# Patient Record
Sex: FEMALE | Race: WHITE
[De-identification: names, ages, dates, MRNs, and addresses within clinical notes are randomized per-mention and may not be internally consistent; named-entity substitution may affect disease eponyms.]

---

## 2017-12-26 ENCOUNTER — HOSPITAL ENCOUNTER (EMERGENCY)
Dept: HOSPITAL 61 - ER | Age: 22
Discharge: HOME | End: 2017-12-26
Payer: COMMERCIAL

## 2017-12-26 DIAGNOSIS — N39.0: ICD-10-CM

## 2017-12-26 DIAGNOSIS — N12: Primary | ICD-10-CM

## 2017-12-26 LAB
ADD MAN DIFF?: YES
ALBUMIN SERPL-MCNC: 3.9 G/DL (ref 3.4–5)
ALBUMIN/GLOB SERPL: 1 {RATIO} (ref 1–1.7)
ALP SERPL-CCNC: 104 U/L (ref 46–116)
ALT (SGPT): 27 U/L (ref 14–59)
ANION GAP SERPL CALC-SCNC: 13 MMOL/L (ref 6–14)
ANISOCYTOSIS: SLIGHT
AST SERPL-CCNC: 17 U/L (ref 15–37)
BACTERIA,URINE: (no result) /HPF
BASO #: 0 X10^3/UL (ref 0–0.2)
BASO %: 0 % (ref 0–3)
BILIRUBIN,URINE: NEGATIVE
BLOOD UREA NITROGEN: 8 MG/DL (ref 7–20)
BUN/CREAT SERPL: 8 (ref 6–20)
CALCIUM: 8.7 MG/DL (ref 8.5–10.1)
CHLORIDE: 100 MMOL/L (ref 98–107)
CO2 SERPL-SCNC: 25 MMOL/L (ref 21–32)
CREAT SERPL-MCNC: 1 MG/DL (ref 0.6–1)
EOS %: 0 % (ref 0–3)
GFR SERPLBLD BASED ON 1.73 SQ M-ARVRAT: 70 ML/MIN
GLOBULIN SER-MCNC: 3.9 G/DL (ref 2.2–3.8)
GLUCOSE SERPL-MCNC: 151 MG/DL (ref 70–99)
GLUCOSE,URINE: NEGATIVE MG/DL
HCG SERPL-ACNC: 15 X10^3/UL (ref 4–11)
HEMATOCRIT: 36.6 % (ref 36–47)
HEMOGLOBIN: 11.8 G/DL (ref 12–15.5)
LYMPH #: 0.5 X10^3/UL (ref 1–4.8)
LYMPH %: 3 % (ref 24–48)
MEAN CORPUSCULAR HEMOGLOBIN: 25 PG (ref 25–35)
MEAN CORPUSCULAR HGB CONC: 32 G/DL (ref 31–37)
MEAN CORPUSCULAR VOLUME: 77 FL (ref 79–100)
MICROCYTOSIS: SLIGHT
MONO %: 6 % (ref 0–9)
NEUT %: 91 % (ref 31–73)
NITRITE,URINE: POSITIVE
OVALOCYTES: (no result)
PH,URINE: 6
PLATELET COUNT: 246 X10^3/UL (ref 140–400)
PLT ESTIMATE: ADEQUATE
POTASSIUM SERPL-SCNC: 3.8 MMOL/L (ref 3.5–5.1)
PROTEIN,URINE: 100 MG/DL
RBC,URINE: (no result) /HPF (ref 0–2)
RED BLOOD COUNT: 4.74 X10^6/UL (ref 3.5–5.4)
RED CELL DISTRIBUTION WIDTH: 18.4 % (ref 11.5–14.5)
SODIUM: 138 MMOL/L (ref 136–145)
SPECIFIC GRAVITY,URINE: 1.02
SQUAMOUS EPITHELIAL CELL,UR: (no result) /LPF
TOTAL BILIRUBIN: 0.8 MG/DL (ref 0.2–1)
TOTAL PROTEIN: 7.8 G/DL (ref 6.4–8.2)
URINE HCG POC: (no result)
UROBILINOGEN,URINE: 0.2 MG/DL
WBC,URINE: (no result) /HPF (ref 0–4)

## 2017-12-26 PROCEDURE — 87186 SC STD MICRODIL/AGAR DIL: CPT

## 2017-12-26 PROCEDURE — 87086 URINE CULTURE/COLONY COUNT: CPT

## 2017-12-26 PROCEDURE — 99284 EMERGENCY DEPT VISIT MOD MDM: CPT

## 2017-12-26 PROCEDURE — 81025 URINE PREGNANCY TEST: CPT

## 2017-12-26 PROCEDURE — 85025 COMPLETE CBC W/AUTO DIFF WBC: CPT

## 2017-12-26 PROCEDURE — 85007 BL SMEAR W/DIFF WBC COUNT: CPT

## 2017-12-26 PROCEDURE — 36415 COLL VENOUS BLD VENIPUNCTURE: CPT

## 2017-12-26 PROCEDURE — 96365 THER/PROPH/DIAG IV INF INIT: CPT

## 2017-12-26 PROCEDURE — 96361 HYDRATE IV INFUSION ADD-ON: CPT

## 2017-12-26 PROCEDURE — 81001 URINALYSIS AUTO W/SCOPE: CPT

## 2017-12-26 PROCEDURE — 96375 TX/PRO/DX INJ NEW DRUG ADDON: CPT

## 2017-12-26 PROCEDURE — 80053 COMPREHEN METABOLIC PANEL: CPT

## 2017-12-26 RX ADMIN — BACITRACIN 1 MLS/HR: 5000 INJECTION, POWDER, FOR SOLUTION INTRAMUSCULAR at 14:20

## 2017-12-26 RX ADMIN — ACETAMINOPHEN 1 MG: 500 TABLET ORAL at 14:25

## 2017-12-26 RX ADMIN — BACITRACIN 1 MLS/HR: 5000 INJECTION, POWDER, FOR SOLUTION INTRAMUSCULAR at 13:00

## 2017-12-26 RX ADMIN — ONDANSETRON 1 MG: 2 INJECTION INTRAMUSCULAR; INTRAVENOUS at 13:00

## 2018-09-16 ENCOUNTER — HOSPITAL ENCOUNTER (EMERGENCY)
Dept: HOSPITAL 61 - ER | Age: 23
Discharge: HOME | End: 2018-09-16
Payer: SELF-PAY

## 2018-09-16 VITALS — BODY MASS INDEX: 16.07 KG/M2 | WEIGHT: 100 LBS | HEIGHT: 66 IN

## 2018-09-16 VITALS — DIASTOLIC BLOOD PRESSURE: 57 MMHG | SYSTOLIC BLOOD PRESSURE: 104 MMHG

## 2018-09-16 DIAGNOSIS — H92.02: ICD-10-CM

## 2018-09-16 DIAGNOSIS — R10.9: ICD-10-CM

## 2018-09-16 DIAGNOSIS — J18.9: Primary | ICD-10-CM

## 2018-09-16 LAB
ALBUMIN SERPL-MCNC: 3.5 G/DL (ref 3.4–5)
ALBUMIN/GLOB SERPL: 0.8 {RATIO} (ref 1–1.7)
ALP SERPL-CCNC: 73 U/L (ref 46–116)
ALT SERPL-CCNC: 17 U/L (ref 14–59)
ANION GAP SERPL CALC-SCNC: 11 MMOL/L (ref 6–14)
APTT PPP: YELLOW S
AST SERPL-CCNC: 18 U/L (ref 15–37)
BACTERIA #/AREA URNS HPF: (no result) /HPF
BASOPHILS # BLD AUTO: 0 X10^3/UL (ref 0–0.2)
BASOPHILS NFR BLD: 0 % (ref 0–3)
BILIRUB SERPL-MCNC: 1 MG/DL (ref 0.2–1)
BILIRUB UR QL STRIP: NEGATIVE
BUN SERPL-MCNC: 6 MG/DL (ref 7–20)
BUN/CREAT SERPL: 8 (ref 6–20)
CALCIUM SERPL-MCNC: 9.3 MG/DL (ref 8.5–10.1)
CHLORIDE SERPL-SCNC: 97 MMOL/L (ref 98–107)
CO2 SERPL-SCNC: 27 MMOL/L (ref 21–32)
CREAT SERPL-MCNC: 0.8 MG/DL (ref 0.6–1)
EOSINOPHIL NFR BLD: 0 % (ref 0–3)
EOSINOPHIL NFR BLD: 0 X10^3/UL (ref 0–0.7)
ERYTHROCYTE [DISTWIDTH] IN BLOOD BY AUTOMATED COUNT: 12.7 % (ref 11.5–14.5)
FIBRINOGEN PPP-MCNC: CLEAR MG/DL
GFR SERPLBLD BASED ON 1.73 SQ M-ARVRAT: 89.7 ML/MIN
GLOBULIN SER-MCNC: 4.2 G/DL (ref 2.2–3.8)
GLUCOSE SERPL-MCNC: 101 MG/DL (ref 70–99)
HCT VFR BLD CALC: 39.9 % (ref 36–47)
HGB BLD-MCNC: 14 G/DL (ref 12–15.5)
LYMPHOCYTES # BLD: 0.8 X10^3/UL (ref 1–4.8)
LYMPHOCYTES NFR BLD AUTO: 8 % (ref 24–48)
MCH RBC QN AUTO: 31 PG (ref 25–35)
MCHC RBC AUTO-ENTMCNC: 35 G/DL (ref 31–37)
MCV RBC AUTO: 89 FL (ref 79–100)
MONO #: 0.8 X10^3/UL (ref 0–1.1)
MONOCYTES NFR BLD: 8 % (ref 0–9)
NEUT #: 9.5 X10^3UL (ref 1.8–7.7)
NEUTROPHILS NFR BLD AUTO: 85 % (ref 31–73)
NITRITE UR QL STRIP: POSITIVE
PH UR STRIP: 6.5 [PH]
PLATELET # BLD AUTO: 209 X10^3/UL (ref 140–400)
POTASSIUM SERPL-SCNC: 3.7 MMOL/L (ref 3.5–5.1)
PROT SERPL-MCNC: 7.7 G/DL (ref 6.4–8.2)
PROT UR STRIP-MCNC: 30 MG/DL
RBC # BLD AUTO: 4.5 X10^6/UL (ref 3.5–5.4)
RBC #/AREA URNS HPF: (no result) /HPF (ref 0–2)
SODIUM SERPL-SCNC: 135 MMOL/L (ref 136–145)
SQUAMOUS #/AREA URNS LPF: (no result) /LPF
UROBILINOGEN UR-MCNC: 1 MG/DL
WBC # BLD AUTO: 11.2 X10^3/UL (ref 4–11)
WBC #/AREA URNS HPF: >40 /HPF (ref 0–4)

## 2018-09-16 PROCEDURE — 85025 COMPLETE CBC W/AUTO DIFF WBC: CPT

## 2018-09-16 PROCEDURE — 99285 EMERGENCY DEPT VISIT HI MDM: CPT

## 2018-09-16 PROCEDURE — 74176 CT ABD & PELVIS W/O CONTRAST: CPT

## 2018-09-16 PROCEDURE — 96374 THER/PROPH/DIAG INJ IV PUSH: CPT

## 2018-09-16 PROCEDURE — 87086 URINE CULTURE/COLONY COUNT: CPT

## 2018-09-16 PROCEDURE — 81025 URINE PREGNANCY TEST: CPT

## 2018-09-16 PROCEDURE — 71046 X-RAY EXAM CHEST 2 VIEWS: CPT

## 2018-09-16 PROCEDURE — 81001 URINALYSIS AUTO W/SCOPE: CPT

## 2018-09-16 PROCEDURE — 80053 COMPREHEN METABOLIC PANEL: CPT

## 2018-09-16 PROCEDURE — 36415 COLL VENOUS BLD VENIPUNCTURE: CPT

## 2018-09-16 NOTE — RAD
CT abdomen and pelvis without contrast:

 

Reason for examination: Flank pain.

 

Helical images were obtained through the abdomen pelvis with no 

intravenous or oral contrast administered. Reconstruction was performed in

sagittal and coronal planes. The examination is compromised by streak 

artifact from right and screws in the thoracolumbar spine.

 

Exposure: One or more of the following individualized dose reduction 

techniques were utilized for this examination:  1. Automated exposure 

control  2. Adjustment of the mA and/or kV according to patient size  3. 

Use of iterative reconstruction technique.

 

The lung bases are clear. The heart size is normal with no pericardial 

effusion.

 

No abnormalities seen at the liver, spleen, adrenal glands or pancreas. 

The kidneys show no renal masses, renal calculi, hydronephrosis or 

evidence of obstructive uropathy. The intestinal tract does not appear to 

be abnormally distended and no abnormal wall thickening is seen. No 

diverticulosis or diverticulitis is evident.

 

The bladder is not distended. IUD is seen in the uterus. No adnexal masses

are seen. The limited amount of intra-abdominal fat the appendix is not 

identified. There is a thoracolumbar scoliosis with rods and screws 

present. No acute bony abnormalities are seen.

 

IMPRESSION:

 

Limited examination due to streak artifact from hardware in the 

thoracolumbar spine region. No renal calculi, hydronephrosis or evidence 

of obstructive uropathy. No other focal abnormality seen in the abdomen or

pelvis.

 

Electronically signed by: Guerda Escudero MD (9/16/2018 9:01 PM) [facility]

## 2018-09-16 NOTE — PHYS DOC
Past Medical History


Past Medical History:  Other


Additional Past Medical Histor:  scoliosis


Past Surgical History:  Other


Additional Past Surgical Histo:  SCOLIOSIS SX, hernia sx


Alcohol Use:  Occasionally


Drug Use:  Marijuana





Adult General


Chief Complaint


Chief Complaint:  Congestion





HPI


HPI





Patient is 23-year-old female that presents for evaluation of fever, cough, 

left ear pain, and bilateral flank pain. She reports symptoms started 

yesterday. She denies abdominal or chest pain.





Review of Systems


Review of Systems








Eyes: Denies change in visual acuity, redness, or eye pain []


HENT: Denies nasal congestion or sore throat []


Respiratory: Denies cough or shortness of breath []


Cardiovascular: No additional information not addressed in HPI []


GI: Denies abdominal pain, nausea, vomiting, bloody stools or diarrhea []


: Denies dysuria or hematuria []





Neurologic: Denies headache, focal weakness or sensory changes []


Endocrine: Denies polyuria or polydipsia []





All other systems were reviewed and found to be within normal limits, except as 

documented in this note.





Current Medications


Current Medications





Current Medications








 Medications


  (Trade)  Dose


 Ordered  Sig/Yee  Start Time


 Stop Time Status Last Admin


Dose Admin


 


 Acetaminophen


  (Tylenol)  1,000 mg  1X  ONCE  9/16/18 19:30


 9/16/18 19:35 DC 9/16/18 19:51


1,000 MG


 


 Ondansetron HCl


  (Zofran)  4 mg  1X  ONCE  9/16/18 19:00


 9/16/18 19:04 DC 9/16/18 19:51


4 MG


 


 Sodium Chloride  1,000 ml @ 


 1,000 mls/hr  1X  ONCE  9/16/18 19:00


 9/16/18 19:59 DC 9/16/18 19:50


1,000 MLS/HR











Allergies


Allergies





Allergies








Coded Allergies Type Severity Reaction Last Updated Verified


 


  No Known Drug Allergies    11/13/14 No











Physical Exam


Physical Exam





Constitutional: Well developed, well nourished, no acute distress, non-toxic 

appearance. []


HENT: Normocephalic, atraumatic, bilateral external ears normal, oropharynx 

moist, no oral exudates, nose normal. []


Eyes: PERRLA, EOMI, conjunctiva normal, no discharge. [] 


Neck: Normal range of motion, no tenderness, supple, no stridor. [] 


Cardiovascular:Heart rate regular rhythm, no murmur []


Lungs & Thorax:  Bilateral breath sounds clear to auscultation []


Abdomen: Bowel sounds normal, soft, no tenderness, no masses, no pulsatile 

masses. [] 


Skin: Warm, dry, no erythema, no rash. [] 


Back: CVA tenderness bilaterally[] 


Extremities: No tenderness, no cyanosis, no clubbing, ROM intact, no edema. [] 


Neurologic: Alert and oriented X 3, normal motor function, normal sensory 

function, no focal deficits noted. []


Psychologic: Affect normal, judgement normal, mood normal. []





Current Patient Data


Vital Signs





 Vital Signs








  Date Time  Temp Pulse Resp B/P (MAP) Pulse Ox O2 Delivery O2 Flow Rate FiO2


 


9/16/18 19:00 100.8 115 18 134/75 (94) 97 Room Air  





 100.8       








Lab Values





 Laboratory Tests








Test


 9/16/18


19:43 9/16/18


20:24 9/16/18


20:25


 


White Blood Count


 11.2 x10^3/uL


(4.0-11.0)  H 


 





 


Red Blood Count


 4.50 x10^6/uL


(3.50-5.40) 


 





 


Hemoglobin


 14.0 g/dL


(12.0-15.5) 


 





 


Hematocrit


 39.9 %


(36.0-47.0) 


 





 


Mean Corpuscular Volume


 89 fL ()


 


 





 


Mean Corpuscular Hemoglobin 31 pg (25-35)    


 


Mean Corpuscular Hemoglobin


Concent 35 g/dL


(31-37) 


 





 


Red Cell Distribution Width


 12.7 %


(11.5-14.5) 


 





 


Platelet Count


 209 x10^3/uL


(140-400) 


 





 


Neutrophils (%) (Auto) 85 % (31-73)  H  


 


Lymphocytes (%) (Auto) 8 % (24-48)  L  


 


Monocytes (%) (Auto) 8 % (0-9)    


 


Eosinophils (%) (Auto) 0 % (0-3)    


 


Basophils (%) (Auto) 0 % (0-3)    


 


Neutrophils # (Auto)


 9.5 x10^3uL


(1.8-7.7)  H 


 





 


Lymphocytes # (Auto)


 0.8 x10^3/uL


(1.0-4.8)  L 


 





 


Monocytes # (Auto)


 0.8 x10^3/uL


(0.0-1.1) 


 





 


Eosinophils # (Auto)


 0.0 x10^3/uL


(0.0-0.7) 


 





 


Basophils # (Auto)


 0.0 x10^3/uL


(0.0-0.2) 


 





 


Sodium Level


 135 mmol/L


(136-145)  L 


 





 


Potassium Level


 3.7 mmol/L


(3.5-5.1) 


 





 


Chloride Level


 97 mmol/L


()  L 


 





 


Carbon Dioxide Level


 27 mmol/L


(21-32) 


 





 


Anion Gap 11 (6-14)    


 


Blood Urea Nitrogen


 6 mg/dL (7-20)


L 


 





 


Creatinine


 0.8 mg/dL


(0.6-1.0) 


 





 


Estimated GFR


(Cockcroft-Gault) 89.7  


 


 





 


BUN/Creatinine Ratio 8 (6-20)    


 


Glucose Level


 101 mg/dL


(70-99)  H 


 





 


Calcium Level


 9.3 mg/dL


(8.5-10.1) 


 





 


Total Bilirubin


 1.0 mg/dL


(0.2-1.0) 


 





 


Aspartate Amino Transferase


(AST) 18 U/L (15-37)


 


 





 


Alanine Aminotransferase (ALT)


 17 U/L (14-59)


 


 





 


Alkaline Phosphatase


 73 U/L


() 


 





 


Total Protein


 7.7 g/dL


(6.4-8.2) 


 





 


Albumin


 3.5 g/dL


(3.4-5.0) 


 





 


Albumin/Globulin Ratio


 0.8 (1.0-1.7)


L 


 





 


POC Urine HCG, Qualitative


 


 Hcg negative


(Negative) 





 


Urine Collection Type   Unknown  


 


Urine Color   Yellow  


 


Urine Clarity   Clear  


 


Urine pH   6.5  


 


Urine Specific Gravity   1.010  


 


Urine Protein


 


 


 30 mg/dL


(NEG-TRACE)


 


Urine Glucose (UA)


 


 


 Negative mg/dL


(NEG)


 


Urine Ketones (Stick)


 


 


 >=80 mg/dL


(NEG)


 


Urine Blood   Trace (NEG)  


 


Urine Nitrite


 


 


 Positive (NEG)





 


Urine Bilirubin


 


 


 Negative (NEG)





 


Urine Urobilinogen Dipstick


 


 


 1.0 mg/dL (0.2


mg/dL)


 


Urine Leukocyte Esterase


 


 


 Moderate (NEG)





 


Urine RBC


 


 


 Occ /HPF (0-2)





 


Urine WBC


 


 


 >40 /HPF (0-4)





 


Urine Squamous Epithelial


Cells 


 


 Many /LPF  





 


Urine Transitional Epithelial


Cells 


 


 Occ /LPF  





 


Urine Renal Epithelial Cells   Occ /LPF  


 


Urine Bacteria


 


 


 Many /HPF


(0-FEW)


 


Urine Mucus   Mod /LPF  





 Laboratory Tests


9/16/18 19:43








 Laboratory Tests


9/16/18 19:43














EKG


EKG


[]





Radiology/Procedures


Radiology/Procedures


Wet read chest x-ray by myself and Dr. Graham, concern for possible developing 

infiltrates in the right lung.[]





PROCEDURE: CT ABDOMEN PELVIS WO CONTRAST





CT abdomen and pelvis without contrast:


 


Reason for examination: Flank pain.


 


Helical images were obtained through the abdomen pelvis with no 


intravenous or oral contrast administered. Reconstruction was performed in


sagittal and coronal planes. The examination is compromised by streak 


artifact from right and screws in the thoracolumbar spine.


 


Exposure: One or more of the following individualized dose reduction 


techniques were utilized for this examination:  1. Automated exposure 


control  2. Adjustment of the mA and/or kV according to patient size  3. 


Use of iterative reconstruction technique.


 


The lung bases are clear. The heart size is normal with no pericardial 


effusion.


 


No abnormalities seen at the liver, spleen, adrenal glands or pancreas. 


The kidneys show no renal masses, renal calculi, hydronephrosis or 


evidence of obstructive uropathy. The intestinal tract does not appear to 


be abnormally distended and no abnormal wall thickening is seen. No 


diverticulosis or diverticulitis is evident.


 


The bladder is not distended. IUD is seen in the uterus. No adnexal masses


are seen. The limited amount of intra-abdominal fat the appendix is not 


identified. There is a thoracolumbar scoliosis with rods and screws 


present. No acute bony abnormalities are seen.


 


IMPRESSION:


 


Limited examination due to streak artifact from hardware in the 


thoracolumbar spine region. No renal calculi, hydronephrosis or evidence 


of obstructive uropathy. No other focal abnormality seen in the abdomen or


pelvis.


 


Electronically signed by: Guerda Escudero MD (9/16/2018 9:01 PM) [facility]





Course & Med Decision Making


Course & Med Decision Making


Pertinent Labs and Imaging studies reviewed. (See chart for details)





[Concern for developing pneumonia in the right lung field, patient is placed on 

a Z-Evangelist, recommend close follow-up with primary care doctor in 2-3 days, return 

to ER for new or worsening symptoms.





Dragon Disclaimer


Dragon Disclaimer


This electronic medical record was generated, in whole or in part, using a 

voice recognition dictation system.





Departure


Departure


Impression:  


 Primary Impression:  


 Pneumonia


 Additional Impression:  


 Otalgia of left ear


Disposition:  01 HOME, SELF-CARE


Condition:  STABLE


Referrals:  


NO PCP (PCP)


Patient Instructions:  Otalgia, Pneumonia, Adult


Scripts


Azithromycin (AZITHROMYCIN PACKET) 1 Gm Packet


1 PACKET PO ONCE, #1 PACKET 0 Refills


   Prov: TEJAL CALLEJAS         9/16/18





Problem Qualifiers











TEJAL CALLEJAS Sep 16, 2018 21:19

## 2018-09-17 ENCOUNTER — HOSPITAL ENCOUNTER (EMERGENCY)
Dept: HOSPITAL 61 - ER | Age: 23
Discharge: HOME | End: 2018-09-17
Payer: SELF-PAY

## 2018-09-17 VITALS — SYSTOLIC BLOOD PRESSURE: 136 MMHG | DIASTOLIC BLOOD PRESSURE: 82 MMHG

## 2018-09-17 VITALS — WEIGHT: 100 LBS | BODY MASS INDEX: 16.07 KG/M2 | HEIGHT: 66 IN

## 2018-09-17 DIAGNOSIS — N12: Primary | ICD-10-CM

## 2018-09-17 DIAGNOSIS — F17.210: ICD-10-CM

## 2018-09-17 LAB
APTT PPP: YELLOW S
BACTERIA #/AREA URNS HPF: (no result) /HPF
BILIRUB UR QL STRIP: NEGATIVE
FIBRINOGEN PPP-MCNC: CLEAR MG/DL
NITRITE UR QL STRIP: NEGATIVE
PH UR STRIP: 6.5 [PH]
PROT UR STRIP-MCNC: NEGATIVE MG/DL
RBC #/AREA URNS HPF: (no result) /HPF (ref 0–2)
SQUAMOUS #/AREA URNS LPF: (no result) /LPF
UROBILINOGEN UR-MCNC: 1 MG/DL

## 2018-09-17 PROCEDURE — 87086 URINE CULTURE/COLONY COUNT: CPT

## 2018-09-17 PROCEDURE — 99284 EMERGENCY DEPT VISIT MOD MDM: CPT

## 2018-09-17 PROCEDURE — 81001 URINALYSIS AUTO W/SCOPE: CPT

## 2018-09-17 PROCEDURE — 81025 URINE PREGNANCY TEST: CPT

## 2018-09-17 NOTE — PHYS DOC
Past Medical History


Past Medical History:  Pneumonia, Other


Additional Past Medical Histor:  scoliosis


Past Surgical History:  Other


Additional Past Surgical Histo:  SCOLIOSIS SX, hernia sx


Smoking:  Less than 1pk/day


Alcohol Use:  Occasionally


Drug Use:  Marijuana





Adult General


Chief Complaint


Chief Complaint:  MULTIPLE COMPLAINTS





HPI


HPI





Patient is a 23  year old female who presents with left flank and left lower 

quadrant abdominal pain. Patient notes that the pain started on Thursday but 

became worse today. Patient describes the pain as sharp without severity 6 out 

of 10, and notes that the pain is worse in the morning but denies any 

aggravating or alleviating factors. She notes that the pain radiates to her 

left flank. Patient notes that she has had some associated nausea and nonbloody 

emesis. Patient denies any dysuria, urgency or frequency. Patient notes that 

she has had 2 episodes of pyelonephritis this year. Patient notes that she also 

had some left ear pain yesterday but that this has resolved today. Patient was 

seen in the emergency department yesterday for similar symptoms and diagnosed 

with suspected pneumonia and prescribed azithromycin.





Review of Systems


Review of Systems





Constitutional: Denies fever or chills []


Eyes: Denies change in visual acuity, redness, or eye pain []


HENT: Denies nasal congestion or sore throat []


Respiratory: Notes cough denies shortness of breath []


Cardiovascular: Chest pain or palpitations[]


GI: Notes abdominal pain, nausea, vomiting, denies bloody stools or diarrhea []


: Denies dysuria or hematuria []


Musculoskeletal: Denies back pain or joint pain []


Integument: Denies rash or skin lesions []


Neurologic: Denies headache, focal weakness or sensory changes []





Complete systems were reviewed and found to be within normal limits, except as 

documented in this note.





Family History


Family History


Noncontributory





Current Medications


Current Medications





Current Medications








 Medications


  (Trade)  Dose


 Ordered  Sig/Yee  Start Time


 Stop Time Status Last Admin


Dose Admin


 


 Cephalexin HCl


  (Keflex)  500 mg  1X  ONCE  9/17/18 19:30


 9/17/18 19:31 DC 9/17/18 20:07


500 MG











Allergies


Allergies





Allergies








Coded Allergies Type Severity Reaction Last Updated Verified


 


  No Known Drug Allergies    11/13/14 No











Physical Exam


Physical Exam





Constitutional: Well developed, well nourished, no acute distress, non-toxic 

appearance. []


HENT: Normocephalic, atraumatic, bilateral external ears normal, oropharynx 

moist, no oral exudates, nose normal. []


Eyes: PERRLA, EOMI, conjunctiva normal, no discharge. [] 


Neck: Normal range of motion, no tenderness, supple, no stridor. [] 


Cardiovascular:Heart rate regular rhythm, no murmur []


Lungs & Thorax:  Bilateral breath sounds clear to auscultation []


Abdomen: Bowel sounds normal, soft, no tenderness, no masses, no pulsatile 

masses. [] 


Skin: Warm, dry, no erythema, no rash. [] 


Back: No tenderness, no CVA tenderness. [] 


Extremities: No tenderness, no cyanosis, no clubbing, ROM intact, no edema. [] 


Neurologic: Alert and oriented X 3, normal motor function, normal sensory 

function, no focal deficits noted. []


Psychologic: Affect normal, judgement normal, mood normal. []





Current Patient Data


Vital Signs





 Vital Signs








  Date Time  Temp Pulse Resp B/P (MAP) Pulse Ox O2 Delivery O2 Flow Rate FiO2


 


9/17/18 18:11  90 18 118/70 (86) 99 Room Air  


 


9/17/18 17:37 99.5       





 99.5       








Lab Values





 Laboratory Tests








Test


 9/17/18


17:46 9/17/18


17:49


 


Urine Collection Type Void   


 


Urine Color Yellow   


 


Urine Clarity Clear   


 


Urine pH 6.5   


 


Urine Specific Gravity <=1.005   


 


Urine Protein


 Negative mg/dL


(NEG-TRACE) 





 


Urine Glucose (UA)


 Negative mg/dL


(NEG) 





 


Urine Ketones (Stick)


 >=80 mg/dL


(NEG) 





 


Urine Blood Small (NEG)   


 


Urine Nitrite


 Negative (NEG)


 





 


Urine Bilirubin


 Negative (NEG)


 





 


Urine Urobilinogen Dipstick


 1.0 mg/dL (0.2


mg/dL) 





 


Urine Leukocyte Esterase


 Moderate (NEG)


 





 


Urine RBC


 Occ /HPF (0-2)


 





 


Urine WBC


 11-20 /HPF


(0-4) 





 


Urine Squamous Epithelial


Cells Many /LPF  


 





 


Urine Bacteria


 Moderate /HPF


(0-FEW) 





 


POC Urine HCG, Qualitative


 


 Hcg negative


(Negative)











EKG


EKG


[]





Radiology/Procedures


Radiology/Procedures


[]





Course & Med Decision Making


Course & Med Decision Making


3-year-old female presenting with left lower quadrant abdominal and flank pain. 

Patient was seen in the emergency department yesterday for the same symptoms, 

but returned today for progression of symptoms. Patient received a CT abdomen 

and pelvis, and urinalysis yesterday with other lab work. Patient was diagnosed 

with a suspected developing left lower lobe pneumonia. Patient was prescribed 

azithromycin. Urinalysis from yesterday showed evidence of contamination so 

treatment for UTI was not begun. In the emergency department today another 

urinalysis was obtained and evaluated and today the urinalysis showed that the 

patient does indeed have a UTI. Patient was prescribed Keflex and given one 

dose in the emergency department.Patient stable for discharge with outpatient 

follow-up with PCP. Discussed findings and plan with patient, who acknowledge 

understanding and agreement.[]





Dragon Disclaimer


Dragon Disclaimer


This electronic medical record was generated, in whole or in part, using a 

voice recognition dictation system.





Departure


Departure


Impression:  


 Primary Impression:  


 Pyelonephritis


Disposition:  01 HOME, SELF-CARE


Condition:  STABLE


Referrals:  


NO PCP (PCP)


Patient Instructions:  Pyelonephritis, Adult, Easy-to-Read


Scripts


Cephalexin (KEFLEX) 500 Mg Capsule


500 MG PO TID for 7 Days, #21 CAP


   Prov: DELFIN FLOYD DO         9/17/18











DELFIN FLOYD DO Sep 17, 2018 19:20

## 2018-09-17 NOTE — RAD
CHEST PA   LATERAL

 

Clinical indications: EAR ACHES AND SIDE PAIN. HX OF 2 KIDNEY INFECTIONS 

THIS YEAR.

Cough and fever.

 

COMPARISON: None available.

Findings: No acute lung infiltrate or pleural effusion or pulmonary edema 

or lung mass or pneumothorax is seen.  The heart size, pulmonary 

vasculature, mediastinum and both emily are unremarkable. Thoracic and 

lumbar Rodrigues rods and transpedicular screws are in place.

 

Impression: No acute radiographic abnormality is seen.

 

Electronically signed by: Lance Cristobal MD (9/17/2018 8:34 AM) [facility]

## 2019-04-09 ENCOUNTER — HOSPITAL ENCOUNTER (EMERGENCY)
Dept: HOSPITAL 61 - ER | Age: 24
Discharge: HOME | End: 2019-04-09
Payer: COMMERCIAL

## 2019-04-09 VITALS — SYSTOLIC BLOOD PRESSURE: 125 MMHG | DIASTOLIC BLOOD PRESSURE: 86 MMHG

## 2019-04-09 VITALS — WEIGHT: 115 LBS | HEIGHT: 66 IN | BODY MASS INDEX: 18.48 KG/M2

## 2019-04-09 DIAGNOSIS — Z73.3: ICD-10-CM

## 2019-04-09 DIAGNOSIS — F41.0: Primary | ICD-10-CM

## 2019-04-09 LAB
ALBUMIN SERPL-MCNC: 4 G/DL (ref 3.4–5)
ALBUMIN/GLOB SERPL: 1.1 {RATIO} (ref 1–1.7)
ALP SERPL-CCNC: 107 U/L (ref 46–116)
ALT SERPL-CCNC: 17 U/L (ref 14–59)
AMPHETAMINE/METHAMPHETAMINE: (no result)
ANION GAP SERPL CALC-SCNC: 9 MMOL/L (ref 6–14)
APTT PPP: YELLOW S
AST SERPL-CCNC: 22 U/L (ref 15–37)
BACTERIA #/AREA URNS HPF: (no result) /HPF
BARBITURATES UR-MCNC: (no result) UG/ML
BASOPHILS # BLD AUTO: 0 X10^3/UL (ref 0–0.2)
BASOPHILS NFR BLD: 1 % (ref 0–3)
BENZODIAZ UR-MCNC: (no result) UG/L
BILIRUB SERPL-MCNC: 0.5 MG/DL (ref 0.2–1)
BILIRUB UR QL STRIP: NEGATIVE
BUN SERPL-MCNC: 7 MG/DL (ref 7–20)
BUN/CREAT SERPL: 9 (ref 6–20)
CALCIUM SERPL-MCNC: 8.9 MG/DL (ref 8.5–10.1)
CANNABINOIDS UR-MCNC: (no result) UG/L
CHLORIDE SERPL-SCNC: 104 MMOL/L (ref 98–107)
CO2 SERPL-SCNC: 28 MMOL/L (ref 21–32)
COCAINE UR-MCNC: (no result) NG/ML
CREAT SERPL-MCNC: 0.8 MG/DL (ref 0.6–1)
EOSINOPHIL NFR BLD: 0 % (ref 0–3)
EOSINOPHIL NFR BLD: 0 X10^3/UL (ref 0–0.7)
ERYTHROCYTE [DISTWIDTH] IN BLOOD BY AUTOMATED COUNT: 13.7 % (ref 11.5–14.5)
FIBRINOGEN PPP-MCNC: CLEAR MG/DL
GFR SERPLBLD BASED ON 1.73 SQ M-ARVRAT: 89.7 ML/MIN
GLOBULIN SER-MCNC: 3.6 G/DL (ref 2.2–3.8)
GLUCOSE SERPL-MCNC: 84 MG/DL (ref 70–99)
HCT VFR BLD CALC: 44 % (ref 36–47)
HGB BLD-MCNC: 15 G/DL (ref 12–15.5)
LYMPHOCYTES # BLD: 1.8 X10^3/UL (ref 1–4.8)
LYMPHOCYTES NFR BLD AUTO: 21 % (ref 24–48)
MAGNESIUM SERPL-MCNC: 1.8 MG/DL (ref 1.8–2.4)
MCH RBC QN AUTO: 31 PG (ref 25–35)
MCHC RBC AUTO-ENTMCNC: 34 G/DL (ref 31–37)
MCV RBC AUTO: 92 FL (ref 79–100)
METHADONE SERPL-MCNC: (no result) NG/ML
MONO #: 0.4 X10^3/UL (ref 0–1.1)
MONOCYTES NFR BLD: 5 % (ref 0–9)
NEUT #: 6.2 X10^3UL (ref 1.8–7.7)
NEUTROPHILS NFR BLD AUTO: 73 % (ref 31–73)
NITRITE UR QL STRIP: NEGATIVE
OPIATES UR-MCNC: (no result) NG/ML
PCP SERPL-MCNC: (no result) MG/DL
PH UR STRIP: 6.5 [PH]
PLATELET # BLD AUTO: 297 X10^3/UL (ref 140–400)
POTASSIUM SERPL-SCNC: 4.4 MMOL/L (ref 3.5–5.1)
PROT SERPL-MCNC: 7.6 G/DL (ref 6.4–8.2)
PROT UR STRIP-MCNC: NEGATIVE MG/DL
RBC # BLD AUTO: 4.8 X10^6/UL (ref 3.5–5.4)
RBC #/AREA URNS HPF: 0 /HPF (ref 0–2)
SODIUM SERPL-SCNC: 141 MMOL/L (ref 136–145)
SQUAMOUS #/AREA URNS LPF: (no result) /LPF
UROBILINOGEN UR-MCNC: 1 MG/DL
WBC # BLD AUTO: 8.5 X10^3/UL (ref 4–11)
WBC #/AREA URNS HPF: (no result) /HPF (ref 0–4)

## 2019-04-09 PROCEDURE — 81025 URINE PREGNANCY TEST: CPT

## 2019-04-09 PROCEDURE — 36415 COLL VENOUS BLD VENIPUNCTURE: CPT

## 2019-04-09 PROCEDURE — 85025 COMPLETE CBC W/AUTO DIFF WBC: CPT

## 2019-04-09 PROCEDURE — 80307 DRUG TEST PRSMV CHEM ANLYZR: CPT

## 2019-04-09 PROCEDURE — 83735 ASSAY OF MAGNESIUM: CPT

## 2019-04-09 PROCEDURE — 80053 COMPREHEN METABOLIC PANEL: CPT

## 2019-04-09 PROCEDURE — 99284 EMERGENCY DEPT VISIT MOD MDM: CPT

## 2019-04-09 PROCEDURE — 81001 URINALYSIS AUTO W/SCOPE: CPT

## 2019-04-09 NOTE — PHYS DOC
Past Medical History


Past Medical History:  Anxiety, Pneumonia, Other


Additional Past Medical Histor:  scoliosis


Past Surgical History:  Other


Additional Past Surgical Histo:  SCOLIOSIS SX, hernia sx


Alcohol Use:  Occasionally


Drug Use:  Marijuana





Adult General


Chief Complaint


Chief Complaint:  ANXIETY/PANIC ATTACK





HPI


HPI





23-year-old female presents to ER for complaints of increased anxiety over the 

past couple of days. She reports she has had increased stressors with finances. 

She reports over the past couple of days she has had decreased appetite, 

difficulty sleeping, episodes of crying and panic attacks. She denies any 

recent illness, urinary symptoms, chest pain or palpitations, or fever.





Patient has boyfriend who she reports also has history of anxiety and is 

prescribed Xanax which she does take from time to time when her anxiety 

increases. She denies taking any Xanax today. She reports he is supportive 

denies any physical abuse stating she feels safe with him. Patient states she 

has 2 young children ages 3 and 1. LMP last month reporting she has irregular 

cycles and does have an IUD. She is a daily smoker and reports occasional 

alcohol 2-3 times per week. She reports she does smoke marijuana and did this 

morning. She reports she does take pain medication for chronic back pain as she 

has to rods in her back from previous surgery. She reports she will take muscle 

relaxers and Percocet for her pain as needed. She denies pain meds today. She 

denies any other illicit drugs.





Patient reports she has had anxiety since she was a teenager. She denies any 

suicidal ideations or past suicidal attempts.





Review of Systems


Review of Systems





Constitutional: Denies fever or chills. Reports having difficulty sleeping


Eyes: Denies change in visual acuity, redness, or eye pain []


HENT: Denies nasal congestion or sore throat []


Respiratory: Denies cough or shortness of breath []


Cardiovascular: No additional information not addressed in HPI []


GI: Denies abdominal pain, nausea, vomiting, bloody stools or diarrhea. Reports 

less appetite


: Denies dysuria or hematuria []


Musculoskeletal: Denies back pain or joint pain []


Integument: Denies rash or skin lesions []


Neurologic: Denies headache, focal weakness or sensory changes []


Psych: Reports anxiety/stress denying SI or any self harm





All other systems were reviewed and found to be within normal limits, except as 

documented in this note.





Current Medications


Current Medications





Current Medications








 Medications


  (Trade)  Dose


 Ordered  Sig/Yee  Start Time


 Stop Time Status Last Admin


Dose Admin


 


 Sodium Chloride  1,000 ml @ 


 1,000 mls/hr  1X  ONCE  4/9/19 15:15


 4/9/19 16:14 DC 4/9/19 14:45


1,000 MLS/HR











Allergies


Allergies





Allergies








Coded Allergies Type Severity Reaction Last Updated Verified


 


  No Known Drug Allergies    11/13/14 No











Physical Exam


Physical Exam





Constitutional: Well developed, well nourished, no acute distress, non-toxic 

appearance. []


HENT: Normocephalic, atraumatic, mucous membranes pink/dry, nose normal. []


Eyes: Pupils equal, no nystagmus, conjunctiva normal, no discharge. [] 


Neck: Normal range of motion, supple, no stridor. [] 


Cardiovascular: Heart rate regular rhythm, no murmur []


Lungs & Thorax:  Bilateral breath sounds clear to auscultation. Resp. equal/

nonlabored


Abdomen: Bowel sounds normal, soft, no tenderness


Skin: Warm, dry, no erythema, no rash. [] 


Back: No tenderness, no CVA tenderness. [] 


Extremities: No tenderness, no cyanosis, no clubbing, ROM intact, no edema. [] 


Neurologic: Alert and oriented X 3, normal motor function, normal sensory 

function, no focal deficits noted. []


Psychologic: Affect normal, judgement normal, tearful- no uncontrollable 

behavior. Cooperative during exam. Denies SI





Current Patient Data


Vital Signs





 Vital Signs








  Date Time  Temp Pulse Resp B/P (MAP) Pulse Ox O2 Delivery O2 Flow Rate FiO2


 


4/9/19 15:50  74 15 125/86 (99) 99 Room Air  


 


4/9/19 14:10 98.7       





 98.7       








Lab Values





 Laboratory Tests








Test


 4/9/19


14:20 4/9/19


14:24 4/9/19


15:35


 


Urine Collection Type Unknown    


 


Urine Color Yellow    


 


Urine Clarity Clear    


 


Urine pH 6.5    


 


Urine Specific Gravity 1.025    


 


Urine Protein


 Negative mg/dL


(NEG-TRACE) 


 





 


Urine Glucose (UA)


 Negative mg/dL


(NEG) 


 





 


Urine Ketones (Stick)


 Negative mg/dL


(NEG) 


 





 


Urine Blood


 Negative (NEG)


 


 





 


Urine Nitrite


 Negative (NEG)


 


 





 


Urine Bilirubin


 Negative (NEG)


 


 





 


Urine Urobilinogen Dipstick


 1.0 mg/dL (0.2


mg/dL) 


 





 


Urine Leukocyte Esterase


 Negative (NEG)


 


 





 


Urine RBC 0 /HPF (0-2)    


 


Urine WBC


 1-4 /HPF (0-4)


 


 





 


Urine Squamous Epithelial


Cells Many /LPF  


 


 





 


Urine Bacteria


 Few /HPF


(0-FEW) 


 





 


Urine Mucus Marked /LPF    


 


Urine Opiates Screen Neg (NEG)    


 


Urine Methadone Screen Neg (NEG)    


 


Urine Barbiturates Neg (NEG)    


 


Urine Phencyclidine Screen Neg (NEG)    


 


Urine


Amphetamine/Methamphetamine Neg (NEG)  


 


 





 


Urine Benzodiazepines Screen Pos (NEG)    


 


Urine Cocaine Screen Neg (NEG)    


 


Urine Cannabinoids Screen Pos (NEG)    


 


Urine Ethyl Alcohol Pos (NEG)    


 


POC Urine HCG, Qualitative


 


 Hcg negative


(Negative) 





 


White Blood Count


 


 


 8.5 x10^3/uL


(4.0-11.0)


 


Red Blood Count


 


 


 4.80 x10^6/uL


(3.50-5.40)


 


Hemoglobin


 


 


 15.0 g/dL


(12.0-15.5)


 


Hematocrit


 


 


 44.0 %


(36.0-47.0)


 


Mean Corpuscular Volume


 


 


 92 fL ()





 


Mean Corpuscular Hemoglobin   31 pg (25-35)  


 


Mean Corpuscular Hemoglobin


Concent 


 


 34 g/dL


(31-37)


 


Red Cell Distribution Width


 


 


 13.7 %


(11.5-14.5)


 


Platelet Count


 


 


 297 x10^3/uL


(140-400)


 


Neutrophils (%) (Auto)   73 % (31-73)  


 


Lymphocytes (%) (Auto)   21 % (24-48)  L


 


Monocytes (%) (Auto)   5 % (0-9)  


 


Eosinophils (%) (Auto)   0 % (0-3)  


 


Basophils (%) (Auto)   1 % (0-3)  


 


Neutrophils # (Auto)


 


 


 6.2 x10^3uL


(1.8-7.7)


 


Lymphocytes # (Auto)


 


 


 1.8 x10^3/uL


(1.0-4.8)


 


Monocytes # (Auto)


 


 


 0.4 x10^3/uL


(0.0-1.1)


 


Eosinophils # (Auto)


 


 


 0.0 x10^3/uL


(0.0-0.7)


 


Basophils # (Auto)


 


 


 0.0 x10^3/uL


(0.0-0.2)


 


Sodium Level


 


 


 141 mmol/L


(136-145)


 


Potassium Level


 


 


 4.4 mmol/L


(3.5-5.1)


 


Chloride Level


 


 


 104 mmol/L


()


 


Carbon Dioxide Level


 


 


 28 mmol/L


(21-32)


 


Anion Gap   9 (6-14)  


 


Blood Urea Nitrogen


 


 


 7 mg/dL (7-20)





 


Creatinine


 


 


 0.8 mg/dL


(0.6-1.0)


 


Estimated GFR


(Cockcroft-Gault) 


 


 89.7  





 


BUN/Creatinine Ratio   9 (6-20)  


 


Glucose Level


 


 


 84 mg/dL


(70-99)


 


Calcium Level


 


 


 8.9 mg/dL


(8.5-10.1)


 


Magnesium Level


 


 


 1.8 mg/dL


(1.8-2.4)


 


Total Bilirubin


 


 


 0.5 mg/dL


(0.2-1.0)


 


Aspartate Amino Transferase


(AST) 


 


 22 U/L (15-37)





 


Alanine Aminotransferase (ALT)


 


 


 17 U/L (14-59)





 


Alkaline Phosphatase


 


 


 107 U/L


()


 


Total Protein


 


 


 7.6 g/dL


(6.4-8.2)


 


Albumin


 


 


 4.0 g/dL


(3.4-5.0)


 


Albumin/Globulin Ratio   1.1 (1.0-1.7)  


 


Ethyl Alcohol Level


 


 


 < 10 mg/dL


(0-10)





 Laboratory Tests


4/9/19 15:35








 Laboratory Tests


4/9/19 15:35














EKG


EKG


[]





Radiology/Procedures


Radiology/Procedures


[]





Course & Med Decision Making


Course & Med Decision Making


Pertinent Labs reviewed. (See chart for details)





1520: Spoke with Marlo with [facility] and discussed pt's case and he is going to 

come to ER and discuss options for outpt therapy and eval. pt.





1615: Marlo harp/[facility] came into eval pt. He reports he is providing pt with 

options for outpt therapy. He reported pt had reported to him she had been 

drinking [place] more for tx. Her alcohol level was <10. She was + for benzos and 

marijuana- which she reported initially she had been using. Other test results 

were normal limits. UA negative for infection and UCG was negative. Test 

results were discussed with patient. Patient reports following IV fluids she is 

feeling better and is comfortable with home discharge with plans to follow-up 

outpatient. Continues to deny any suicidal ideations. She is in no visible 

distress at time of discharge discussion. In-depth conversation had with 

patient regarding alcohol cessation, marijuana cessation, and signs and 

symptoms to return to ER for. Discharge instructions were discussed. Pt 

encouraged to increase fluid intake and eat well-balanced meals. Patient states 

she has been using over-the-counter melatonin from time to time for sleep aid. 

Patient advised on using that medication as directed on container.





Dragon Disclaimer


Dragon Disclaimer


This electronic medical record was generated, in whole or in part, using a 

voice recognition dictation system.





Departure


Departure


Impression:  


 Primary Impression:  


 Anxiety


 Additional Impression:  


 Stress


Disposition:  01 HOME, SELF-CARE


Condition:  STABLE


Referrals:  


NO PCP (PCP)


Patient Instructions:  Anxiety and Panic Attacks





Additional Instructions:  


Drink plenty of water and eat well-balanced meals.





Avoid alcohol and drug use as this may increase your anxiety and cause other 

medical issues.





Follow-up with the clinic as discussed with Marlo in the Emergency 

Department. Return to the emergency Department with any suicidal ideations or 

call 911 for assistance.





Problem Qualifiers











CHRISTY RODRIGUEZ Apr 9, 2019 15:25